# Patient Record
Sex: MALE | Race: WHITE | Employment: OTHER | URBAN - METROPOLITAN AREA
[De-identification: names, ages, dates, MRNs, and addresses within clinical notes are randomized per-mention and may not be internally consistent; named-entity substitution may affect disease eponyms.]

---

## 2022-08-27 ENCOUNTER — OFFICE VISIT (OUTPATIENT)
Dept: ORTHOPEDIC SURGERY | Age: 42
End: 2022-08-27
Payer: COMMERCIAL

## 2022-08-27 VITALS — WEIGHT: 210 LBS | BODY MASS INDEX: 27.83 KG/M2 | HEIGHT: 73 IN

## 2022-08-27 DIAGNOSIS — M25.511 ACUTE PAIN OF RIGHT SHOULDER: Primary | ICD-10-CM

## 2022-08-27 DIAGNOSIS — M75.21 BICEPS TENDINITIS OF RIGHT SHOULDER: ICD-10-CM

## 2022-08-27 PROCEDURE — 99203 OFFICE O/P NEW LOW 30 MIN: CPT | Performed by: ORTHOPAEDIC SURGERY

## 2022-08-27 PROCEDURE — 20610 DRAIN/INJ JOINT/BURSA W/O US: CPT | Performed by: ORTHOPAEDIC SURGERY

## 2022-08-27 RX ORDER — LIDOCAINE HYDROCHLORIDE 10 MG/ML
4 INJECTION, SOLUTION INFILTRATION; PERINEURAL ONCE
Status: COMPLETED | OUTPATIENT
Start: 2022-08-27 | End: 2022-08-27

## 2022-08-27 RX ORDER — TRIAMCINOLONE ACETONIDE 40 MG/ML
80 INJECTION, SUSPENSION INTRA-ARTICULAR; INTRAMUSCULAR ONCE
Status: COMPLETED | OUTPATIENT
Start: 2022-08-27 | End: 2022-08-27

## 2022-08-27 RX ADMIN — TRIAMCINOLONE ACETONIDE 80 MG: 40 INJECTION, SUSPENSION INTRA-ARTICULAR; INTRAMUSCULAR at 09:26

## 2022-08-27 RX ADMIN — LIDOCAINE HYDROCHLORIDE 4 ML: 10 INJECTION, SOLUTION INFILTRATION; PERINEURAL at 09:25

## 2022-08-27 NOTE — PROGRESS NOTES
Serina Locke is seen today for evaluation of right shoulder pain. He was initially injured about 4 months ago swinging on a rope swing over river he felt a sharp pain in the anterior aspect of the right shoulder. He was treated by chiropractor for presumed bicipital tendinitis. He currently complains of discomfort with overhead activities, when he first gets up in the morning, and when he tries to do things like play pickle ball. He has been doing a stretching program and strengthening program.  With the arm at the side he does not have much pain. Pain is currently 2 out of 10. He is right-hand dominant. He is otherwise healthy. 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 History: Patient's relevant past family, medical, and social history are reviewed as part of today's visit. ROS of pertinent positives and negatives as above; otherwise negative. General Exam:    Vitals: Height 6' 1\" (1.854 m), weight 210 lb (95.3 kg). Constitutional: Patient is adequately groomed with no evidence of malnutrition  Mental Status: The patient is oriented to time, place and person. The patient's mood and affect are appropriate. Gait:  Patient walks with normal gait and station. Lymphatic: The lymphatic examination bilaterally reveals all areas to be without enlargement or induration. Vascular: Examination reveals no swelling or calf tenderness. Peripheral pulses are palpable and 2+. Neurological: The patient has good coordination. There is no weakness or sensory deficit. Skin:    Head/Neck: inspection reveals no rashes, ulcerations or lesions. Trunk:  inspection reveals no rashes, ulcerations or lesions. Right Lower Extremity: inspection reveals no rashes, ulcerations or lesions. Left Lower Extremity: inspection reveals no rashes, ulcerations or lesions. Examination of the cervical spine reveals no restriction in motion.   There are no reproduction of symptoms into either arm with flexion, extension, rotation or palpation. The patient has a negative Spurling sign, and no tenderness. Examination of the left shoulder reveals normal scapular control and no prominence. There is no pain over the acromioclavicular or sternoclavicular joints. The patient has no biceps pain. There is full range of motion. There is no pain with impingement testing. There is no pain with Leary maneuver. Lavaca's maneuver is normal.  There is no pain or apprehension in the abducted externally rotated position. There is no sulcus sign. There is no instability with anterior or posterior stress applied. The patient demonstrates full strength in the supraspinatus, infraspinatus, and subscapularis. Neurologic and vascular examination of the upper extremity  is normal.    Right shoulder exam shows mild tenderness over the long head bicep. He has pain with simulated throwing, palpation, and with Lavaca's maneuver. He has full strength in the cuff with only mild discomfort in the Leary and impingement positions. Xrays of the right shoulder were obtained today in the office and interpreted by me : AP in the scapular plane, axillary lateral, and scapular Y. These demonstrate: No acute bony abnormalities. Assessment: Right shoulder bicep tendinitis    Plan: I am recommending intra-articular cortisone injection followed by appropriate orthopedic based physical therapy. Procedure: Under sterile technique, right shoulder injected anteriorly into the glenohumeral space 80 mg of Kenalog and 40 mg of lidocaine. He tolerated that well. Will make referral for PT. If he has ongoing symptoms in 6 or 8 weeks I would recommend an MRI and he will let us know.

## 2022-09-01 ENCOUNTER — HOSPITAL ENCOUNTER (OUTPATIENT)
Dept: PHYSICAL THERAPY | Age: 42
Setting detail: THERAPIES SERIES
Discharge: HOME | End: 2022-09-01
Payer: COMMERCIAL

## 2022-09-01 PROCEDURE — 97110 THERAPEUTIC EXERCISES: CPT | Performed by: PHYSICAL THERAPIST

## 2022-09-01 PROCEDURE — 97161 PT EVAL LOW COMPLEX 20 MIN: CPT | Performed by: PHYSICAL THERAPIST

## 2022-09-01 PROCEDURE — 97530 THERAPEUTIC ACTIVITIES: CPT | Performed by: PHYSICAL THERAPIST

## 2022-09-01 NOTE — FLOWSHEET NOTE
[facility] a Department of [address]  [facility], [address]  Office: [phone]  Fax:  [phone]                     Physical Therapy Treatment Note/ Progress Report:           Date:  2022    Patient Name:  Rad Palomino    :  1980  MRN: [id number]  Restrictions/Precautions:    Medical/Treatment Diagnosis Information:  Diagnosis: M75.21 (ICD-10-CM) - Biceps tendinitis of right shoulder; M25.511 (ICD-10-CM) - Acute pain of right shoulder  Treatment Diagnosis: M25.511 (ICD-10-CM) - Acute pain of right shoulder  Insurance/Certification information:  PT Insurance Information: [employer]  Physician Information:  Referring Provider (secondary): Sapna  Has the plan of care been signed (Y/N):        []  Yes  [x]  No     Date of Patient follow up with Physician: after -22 Oct 2022 prn      Is this a Progress Report:     []  Yes  [x]  No        If Yes:  Date Range for reporting period:  Beginning 2022  Ending    Progress report will be due (10 Rx or 30 days whichever is less): 2022        Visit # Insurance Allowable Auth Required   1 BMN []  Yes [x]  No        Functional Scale: FOTO-64   Date assessed: 2022      Latex Allergy:  [x]NO      []YES  Preferred Language for Healthcare:   [x]English       []other:    Pain level:  See eval     SUBJECTIVE:  See eval    OBJECTIVE: See eval  Observation:   Test measurements:      ROM PROM AROM  Comment    L R L R    Flexion        Abduction        ER        IR        Other        Other             Strength L R Comment   Flexion      Abduction      ER      IR      Supraspinatus      Upper Trap      Lower Trap      Mid Trap      Rhomboids      Biceps      Triceps      Horizontal Abduction      Horizontal Adduction      Lats          RESTRICTIONS/PRECAUTIONS: COVID-  without lasting effects;  appendectomy ;        Exercises/Interventions:   Exercise/Equipment Resistance/Repetitions Other comments   Aerobic Conditioning     Aerodyne          Stretching/PROM     Wand     Table Slides     Wall slides  10x:10 Flex/scap   UE Primrose- ER 10x:10 Supine at 90. Reviewed for sitting bow   Pulleys     Pendulum     BB IR     SL IR 10x:10 On wall   Pec doorway stretch     CBA stretch     UT stretch     LS stretch     Isometrics     Retraction          Weight shift     Flexion     Abduction     External Rotation     Internal Rotation     Biceps     Triceps          PRE's     Flexion     Abduction     External Rotation     Internal Rotation     Shrugs     EXT     Reverse Flys     Serratus 3x10    Horizontal Abd with ER     Biceps     Triceps     Retraction          Cable Column/Theraband     External Rotation     Internal Rotation     Shrugs     Lats     Ext     Flex     Scapular Retraction 3x10 silver Progress NV   BIC     TRIC     PNF          Dynamic Stability          Plyoback                  Therapeutic Exercise and NMR EXR  [x] (92268) Provided verbal/tactile cueing for activities related to strengthening, flexibility, endurance, ROM for improvements in LE, proximal hip, and core control with self care, mobility, lifting, ambulation. [x] (31443) Provided verbal/tactile cueing for activities related to improving balance, coordination, kinesthetic sense, posture, motor skill, proprioception  to assist with LE, proximal hip, and core control in self care, mobility, lifting, ambulation and eccentric single leg control.      NMR and Therapeutic Activities:    [x] (45321 or 88732) Provided verbal/tactile cueing for activities related to improving balance, coordination, kinesthetic sense, posture, motor skill, proprioception and motor activation to allow for proper function of core, proximal hip and LE with self care and ADLs  [x] (41890) Gait Re-education- Provided training and instruction to the patient for proper LE, core and proximal hip recruitment and positioning and eccentric body weight control with ambulation re-education including up and down stairs     Home Exercise Program:    [x] (90065) Reviewed/Progressed HEP activities related to strengthening, flexibility, endurance, ROM of core, proximal hip and LE for functional self-care, mobility, lifting and ambulation/stair navigation   [x] (14845)Reviewed/Progressed HEP activities related to improving balance, coordination, kinesthetic sense, posture, motor skill, proprioception of core, proximal hip and LE for self care, mobility, lifting, and ambulation/stair navigation      Access Code: [email or web address]  URL: [email or web address]  Date: 09/01/2022  Prepared by: Melanie Rodriguez Cessna    Exercises  Shoulder Flexion Wall Slide with Towel - 2 x daily - 7 x weekly - 10 reps - 10 hold  Standing Shoulder Abduction Wall Slide with Thumb Out - 2 x daily - 7 x weekly - 10 reps  Supine Shoulder External Rotation in 45 Degrees Abduction AAROM with Dowel - 2 x daily - 7 x weekly - 10 reps - 10 hold  Seated Shoulder External Rotation PROM on Table - 2 x daily - 7 x weekly - 10 reps - 10 hold  Standing Sleeper Stretch at [place] Gladwin - 2 x daily - 7 x weekly - 10 reps - 10 hold  Supine Scapular Protraction in Flexion with Dumbbells - 2 x daily - 7 x weekly - 3 sets - 10 reps  Scapular Retraction with Resistance - 2 x daily - 7 x weekly - 3 sets - 10 reps      Manual Treatments:  PROM / STM / Oscillations-Mobs:  G-I, II, III, IV (PA's, Inf., Post.)  [x] (77448) Provided manual therapy to mobilize LE, proximal hip and/or LS spine soft tissue/joints for the purpose of modulating pain, promoting relaxation,  increasing ROM, reducing/eliminating soft tissue swelling/inflammation/restriction, improving soft tissue extensibility and allowing for proper ROM for normal function with self care, mobility, lifting and ambulation.      Modalities:      Charges:  Timed Code Treatment Minutes: 30'   Total Treatment Minutes: 72'     [x] MANISHA (LOW) [phone]   [] MANISHA (MOD) 91120   [] EVAL (HIGH) 49811   [] RE-EVAL   [x] CA(67433) x 1     [] IONTO  [] NMR (58264) x     [] VASO  [] Manual (08465) x      [] Other:  [x] TA x   1   [] Mech Traction (57937)  [] ES(attended) (92149)      [] ES (un) (41307):       GOALS:   Patient stated goal: heal biceps tendon; relieve pain    [] Progressing: [] Met: [] Not Met: [] Adjusted     Therapist goals for Patient:   Short Term Goals: To be achieved in: 2 weeks  1. Independent in HEP and progression per patient tolerance, in order to prevent re-injury. [] Progressing: [] Met: [] Not Met: [] Adjusted   2. Patient will report pain at worst less than or equal to 4/10. [] Progressing: [] Met: [] Not Met: [] Adjusted     Long Term Goals: To be achieved in: 8 weeks  1. Pt will demo FOTO of 78 or better to assist with reaching prior level of function. [] Progressing: [] Met: [] Not Met: [] Adjusted  2. Patient will demonstrate increased AROM to shoulder flex greater than or equal to 174            , ABD greater than or equal to 170, ER greater than or equal to 80, IR greater than or equal to 50 to allow for proper joint functioning as indicated by patients Functional Deficits. [] Progressing: [] Met: [] Not Met: [] Adjusted  3. Patient will demonstrate an increase in strength to shoulder flex greater than or equal to 4+, ABD greater than or equal to 4, ER greater than or equal to 4, IR greater than or equal to 4+ to allow for proper functional mobility as indicated by patients functional deficits. [] Progressing: [] Met: [] Not Met: [] Adjusted  4. Patient will return to reaching overhead without increased symptoms or restriction. [] Progressing: [] Met: [] Not Met: [] Adjusted  5. Pt will return to normal workout routine without c/o pain. [] Progressing: [] Met: [] Not Met: [] Adjusted       6. Pt will report pain at worst less than or equal to 2/10.    [] Progressing: [] Met: [] Not Met: [] Adjusted       Overall Progression Towards Functional goals/ Treatment Progress Update:  [] Patient is progressing as expected towards functional goals listed. [] Progression is slowed due to complexities/Impairments listed. [] Progression has been slowed due to co-morbidities. [x] Plan just implemented, too soon to assess goals progression <30days   [] Goals require adjustment due to lack of progress  [] Patient is not progressing as expected and requires additional follow up with physician  [] Other    Prognosis for POC: [x] Good [] Fair  [] Poor      Patient requires continued skilled intervention: [x] Yes  [] No    Treatment/Activity Tolerance:  [x] Patient able to complete treatment  [] Patient limited by fatigue  [] Patient limited by pain     [] Patient limited by other medical complications  [] Other: Pt is a 34 y/o male presenting with diagnosis of right shoulder biceps tendinitis from the MD.  Clinically, the pt presents with decreased ROM, decreased strength, decreased function, and increased pain consistent with the MD diagnosis. The pt would benefit from skilled PT to return to [facility]. Pt has had shoulder pain for months. He recently saw Dr. Emily Thakkar and received a cortisone injection, which has been very helpful. He does have significantly limited ROM and some pain with these motions. Pt will be in town through the year. We did discuss gym options and how to modify his routine for home. He has had significant relief since his injection. We did review insurance benefits. All of pt's questions were answered. Pt was agreeable. PLAN: If pt doesn't return, this note can be considered a D/C note.   See eval  [] Continue per plan of care [] Alter current plan (see comments above)  [x] Plan of care initiated [] Hold pending MD visit [] Discharge    Electronically signed by: Katja Parks, PT, DPT, Board-Certified Clinical Specialist in Orthopaedic Physical Therapy [id number]

## 2022-09-01 NOTE — PLAN OF CARE
[facility] a Department of [address] [facility], [address]  Office: [phone]  Fax:  [phone]        Physical Therapy Certification    Dear Referring Provider (secondary): Sapna,    We had the pleasure of evaluating the following patient for physical therapy services at [facility]. A summary of our findings can be found in the initial assessment below. This includes our plan of care. If you have any questions or concerns regarding these findings, please do not hesitate to contact me at the office phone number checked above. Thank you for the referral.       Physician Signature:_______________________________Date:__________________  By signing above (or electronic signature), therapists plan is approved by physician      Patient: Ishmael Turpin   : 1980   MRN: [id number]  Referring Physician: Referring Provider (secondary): Sapna      Evaluation Date: 2022      Medical Diagnosis Information:  Diagnosis: M75.21 (ICD-10-CM) - Biceps tendinitis of right shoulder; M25.511 (ICD-10-CM) - Acute pain of right shoulder   Treatment Diagnosis: M25.511 (ICD-10-CM) - Acute pain of right shoulder                                           Precautions/ Contra-indications: COVID-  without lasting effects;  appendectomy ;   C-SSRS Triggered by Intake questionnaire (Past 2 wk assessment):   [x] No, Questionnaire did not trigger screening.   [] Yes, Patient intake triggered further evaluation      [] C-SSRS Screening completed  [] PCP notified via Plan of Care  [] Emergency services notified   Latex Allergy:  [x]NO      []YES  Preferred Language for Healthcare:   [x]English       []other:    SUBJECTIVE: Patient stated complaint:About 3-4 months ago, pt was going off a rope swing. He felt a burning in his shoulder. He ignored it for a fair amount of time.   About 6-7 wks ago, he was seeing a chiro for something else. He was dx with biceps tendon. He did some therapy on it. This was back home in [place], [place]. Pt is RHD. Then he moved here for work. The pain happened intermittently. He would get sharp intermittent moments of pain like with playing pickleball (OH stroke). Then he had pain throwing the covers over himself. Then he sought MD attention. He was seen by Dr. Morro Call, and he received a cortisone injection. He was working out in [employer] 3x/wk, but he has modified this and avoided working his UE to make sure that he wasn't irritating anything. He feels much better af the injection, but he wants to make sure he doesn't do anything to irritate it. Relevant Medical History:COVID-  without lasting effects;  appendectomy 2003; Functional Scale:  FOTO-64    Pain Scale: 0/10  Easing factors: chiro, injection, ice occ, Advil  Provocative factors: playing pickle ball; 6-7/10 at worst, saw stars and dropped his arm; throwing sheets over; sitting up in bed putting arms behind him; certain movements hurt; currently avoiding reaching OH    Type: []Constant   []Intermittent  []Radiating []Localized []other: sharp/stabbing and deep with certain mvts; feels hot     Numbness/Tingling: none    Functional Limitations/Impairments: [x]Lifting/reaching []Grooming []Carrying    []ADL's  []Driving [x]Sports/Recreations   []Other:    Occupation/School:     Living Status/Prior Level of Function: Independent with ADLs and IADLs, basketball; press on bench with free weights, and biceps; Did Pilates 2x/wk at home with reformer at home; play basketball once a week if he can; Pickle ball 1x/wk. Lifting 3x/wk. Usually 3-4x wk workout and a sport on the weekend.     (insert highest prior level of function)    OBJECTIVE:     CERV ROM WNL    Cervical Flexion     Cervical Extension     Cervical SB     Cervical rotation         ROM PROM AROM  Comment    L R L R    Flexion   174 170 pain Abduction   182 147 pain    ER   72 68    IR   51 47    Other        Other             Strength L R Comment   Flexion 4+ 4 feels something    Abduction 4 4    ER 4 4-    IR 4+ 4    Supraspinatus      Upper Trap      Lower Trap      Mid Trap      Rhomboids      Biceps      Triceps      Horizontal Abduction      Horizontal Adduction      Lats        Special Tests Left Right   Apley Scratch IR:  ER:   Cross body: IR:  ER:  Cross Body: -   Neer's  Slight postive   Full Can     Empty Can     Romelle Pace  -   Nerve Tension Testing     Speed's - -   Willson's      Spurling's     Repeated Scaption                Reflexes/Sensation (myotomes/dermatomes): intact    Joint mobility: stiff   []Normal    []Hypo   []Hyper    Palpation: -TTP    Functional Mobility/Transfers: see above    Posture: fwd head, thoracic kyphosis posture    Bandages/Dressings/Incisions: NA    Gait:  WNL    Orthopedic Special Tests:  see above                       [x] Patient history, allergies, meds reviewed. Medical chart reviewed. See intake form. Review Of Systems (ROS):  [x]Performed Review of systems (Integumentary, CardioPulmonary, Neurological) by intake and observation. Intake form has been scanned into medical record. Patient has been instructed to contact their primary care physician regarding ROS issues if not already being addressed at this time.       Co-morbidities/Complexities (which will affect course of rehabilitation):   [x]None           Arthritic conditions   []Rheumatoid arthritis (M05.9)  []Osteoarthritis (M19.91)   Cardiovascular conditions   []Hypertension (I10)  []Hyperlipidemia (E78.5)  []Angina pectoris (I20)  []Atherosclerosis (I70)   Musculoskeletal conditions   []Disc pathology   []Congenital spine pathologies   []Prior surgical intervention  []Osteoporosis (M81.8)  []Osteopenia (M85.8)   Endocrine conditions   []Hypothyroid (E03.9)  []Hyperthyroid Gastrointestinal conditions   []Constipation (P18.00)   Metabolic conditions   []Morbid obesity (E66.01)  []Diabetes type 1(E10.65) or 2 (E11.65)   []Neuropathy (G60.9)     Pulmonary conditions   []Asthma (J45)  []Coughing   []COPD (J44.9)   Psychological Disorders  []Anxiety (F41.9)  []Depression (F32.9)   []Other:   []Other:          Barriers to/and or personal factors that will affect rehab potential:              []Age  []Sex              [x]Motivation/Lack of Motivation                        []Co-Morbidities              []Cognitive Function, education/learning barriers              []Environmental, home barriers              []profession/work barriers  [x]past PT/medical experience  []other:  Justification: Pt has had shoulder pain for months. He recently saw Dr. Afshin Fermin and received a cortisone injection, which has been very helpful. He does have significantly limited ROM and some pain with these motions. Pt will be in town through the year. Falls Risk Assessment (30 days):   [x] Falls Risk assessed and no intervention required.   [] Falls Risk assessed and Patient requires intervention due to being higher risk   TUG score (>12s at risk):     [] Falls education provided, including           ASSESSMENT:   Functional Impairments   []Noted spinal or UE joint hypomobility   []Noted spinal or UE joint hypermobility   [x]Decreased UE functional ROM   [x]Decreased UE functional strength   []Abnormal reflexes/sensation/myotomal/dermatomal deficits   []Decreased RC/scapular/core strength and neuromuscular control   []other:      Functional Activity Limitations (from functional questionnaire and intake)   [x]Reduced ability to tolerate prolonged functional positions   [x]Reduced ability or difficulty with changes of positions or transfers between positions   [x]Reduced ability to maintain good posture and demonstrate good body mechanics with sitting, bending, and lifting   [] Reduced ability or tolerance with driving and/or computer work   []Reduced ability to sleep   []Reduced ability to perform lifting, reaching, carrying tasks   []Reduced ability to tolerate impact through UE   []Reduced ability to reach behind back   []Reduced ability to  or hold objects   [x]Reduced ability to throw or toss an object   []other:    Participation Restrictions   []Reduced participation in self care activities   [x]Reduced participation in home management activities   []Reduced participation in work activities   [x]Reduced participation in social activities. [x]Reduced participation in sport/recreation activities. Classification:   []Signs/symptoms consistent with post-surgical status including decreased ROM, strength and function. []Signs/symptoms consistent with joint sprain/strain   []Signs/symptoms consistent with shoulder impingement   []Signs/symptoms consistent with shoulder/elbow/wrist tendinopathy   []Signs/symptoms consistent with Rotator cuff tear   []Signs/symptoms consistent with labral tear   []Signs/symptoms consistent with postural dysfunction    []Signs/symptoms consistent with Glenohumeral IR Deficit - <45 degrees   []Signs/symptoms consistent with facet dysfunction of cervical/thoracic spine    []Signs/symptoms consistent with pathology which may benefit from Dry needling     []other: Pt is a 36 y/o male presenting with diagnosis of right shoulder biceps tendinitis from the MD.  Clinically, the pt presents with decreased ROM, decreased strength, decreased function, and increased pain consistent with the MD diagnosis. The pt would benefit from skilled PT to return to PLOF. Pt has had shoulder pain for months. He recently saw Dr. Irvin Sharma and received a cortisone injection, which has been very helpful. He does have significantly limited ROM and some pain with these motions. Pt will be in town through the year. We did discuss gym options and how to modify his routine for home. He has had significant relief since his injection. We did review insurance benefits.   All of pt's questions were answered. Pt was agreeable. Prognosis/Rehab Potential:      []Excellent   [x]Good    []Fair   []Poor    Tolerance of evaluation/treatment:    []Excellent   [x]Good    []Fair   []Poor    PLAN  Frequency/Duration:  1-2 days per week for 6-8 Weeks:  Interventions:  [x]  Therapeutic exercise including: strength training, ROM, for Lower extremity and core   [x]  NMR activation and proprioception for LE, Glutes and Core   [x]  Manual therapy as indicated for LE, Hip and spine to include: Dry Needling/IASTM, STM, PROM, Gr I-IV mobilizations, manipulation. [x] Modalities as needed that may include: thermal agents, E-stim, Biofeedback, US, iontophoresis as indicated  [x] Patient education on joint protection, postural re-education, activity modification, progression of HEP. HEP instruction: (see scanned forms)      GOALS:   Patient stated goal: heal biceps tendon; relieve pain    [] Progressing: [] Met: [] Not Met: [] Adjusted    Therapist goals for Patient:   Short Term Goals: To be achieved in: 2 weeks  1. Independent in HEP and progression per patient tolerance, in order to prevent re-injury. [] Progressing: [] Met: [] Not Met: [] Adjusted   2. Patient will report pain at worst less than or equal to 4/10. [] Progressing: [] Met: [] Not Met: [] Adjusted    Long Term Goals: To be achieved in: 8 weeks  1. Pt will demo FOTO of 78 or better to assist with reaching prior level of function. [] Progressing: [] Met: [] Not Met: [] Adjusted  2. Patient will demonstrate increased AROM to shoulder flex greater than or equal to 174 , ABD greater than or equal to 170, ER greater than or equal to 80, IR greater than or equal to 50 to allow for proper joint functioning as indicated by patients Functional Deficits. [] Progressing: [] Met: [] Not Met: [] Adjusted  3.  Patient will demonstrate an increase in strength to shoulder flex greater than or equal to 4+, ABD greater than or equal to 4, ER greater than or equal to 4, IR greater than or equal to 4+ to allow for proper functional mobility as indicated by patients functional deficits. [] Progressing: [] Met: [] Not Met: [] Adjusted  4. Patient will return to reaching overhead without increased symptoms or restriction. [] Progressing: [] Met: [] Not Met: [] Adjusted  5. Pt will return to normal workout routine without c/o pain. [] Progressing: [] Met: [] Not Met: [] Adjusted   6. Pt will report pain at worst less than or equal to 2/10. [] Progressing: [] Met: [] Not Met: [] Adjusted          Physical Therapy Evaluation Complexity Justification  [x] A history of present problem with:  [] no personal factors and/or comorbidities that impact the plan of care;  [x]1-2 personal factors and/or comorbidities that impact the plan of care  []3 personal factors and/or comorbidities that impact the plan of care  [x] An examination of body systems using standardized tests and measures addressing any of the following: body structures and functions (impairments), activity limitations, and/or participation restrictions;:  [] a total of 1-2 or more elements   [] a total of 3 or more elements   [x] a total of 4 or more elements   [x] A clinical presentation with:  [x] stable and/or uncomplicated characteristics   [] evolving clinical presentation with changing characteristics  [] unstable and unpredictable characteristics;   [x] Clinical decision making of [x] low, [] moderate, [] high complexity using standardized patient assessment instrument and/or measurable assessment of functional outcome.     [x] EVAL (LOW) 50479 (typically 20 minutes face-to-face)  [] EVAL (MOD) 99074 (typically 30 minutes face-to-face)  [] EVAL (HIGH) 82368 (typically 45 minutes face-to-face)  [] RE-EVAL 35973    Electronically signed by:  Kandice Juárez, PT, DPT, Board-Certified Clinical Specialist in Orthopaedic Physical Therapy [id number]

## 2022-09-08 ENCOUNTER — APPOINTMENT (OUTPATIENT)
Dept: PHYSICAL THERAPY | Age: 42
End: 2022-09-08
Payer: COMMERCIAL

## 2022-09-12 ENCOUNTER — HOSPITAL ENCOUNTER (OUTPATIENT)
Dept: PHYSICAL THERAPY | Age: 42
Setting detail: THERAPIES SERIES
Discharge: HOME | End: 2022-09-12
Payer: COMMERCIAL

## 2022-09-12 PROCEDURE — 97112 NEUROMUSCULAR REEDUCATION: CPT | Performed by: PHYSICAL THERAPIST

## 2022-09-12 PROCEDURE — 97110 THERAPEUTIC EXERCISES: CPT | Performed by: PHYSICAL THERAPIST

## 2022-09-12 PROCEDURE — 97530 THERAPEUTIC ACTIVITIES: CPT | Performed by: PHYSICAL THERAPIST

## 2022-09-12 NOTE — FLOWSHEET NOTE
100 [facility] a Department of [address]  [address], [address]  Office: [phone]  Fax:  [phone]                     Physical Therapy Treatment Note/ Progress Report:           Date:  2022    Patient Name:  Guera Welsh    :  1980  MRN: [id number]  Restrictions/Precautions:    Medical/Treatment Diagnosis Information:  Diagnosis: M75.21 (ICD-10-CM) - Biceps tendinitis of right shoulder; M25.511 (ICD-10-CM) - Acute pain of right shoulder  Treatment Diagnosis: M25.511 (ICD-10-CM) - Acute pain of right shoulder  Insurance/Certification information:  PT Insurance Information: Matt Lara  Physician Information:  Referring Provider (secondary): Sapna  Has the plan of care been signed (Y/N):        []  Yes  [x]  No     Date of Patient follow up with Physician: after 8-22 Oct 2022 prn      Is this a Progress Report:     []  Yes  [x]  No        If Yes:  Date Range for reporting period:  Beginning 2022  Ending    Progress report will be due (10 Rx or 30 days whichever is less): 2022        Visit # Insurance Allowable Auth Required   2 BMN []  Yes [x]  No        Functional Scale: Dorian Balderas   Date assessed: 2022      Latex Allergy:  [x]NO      []YES  Preferred Language for Healthcare:   [x]English       []other:    Pain level:  0/10     SUBJECTIVE:  He is feeling pretty good overall. He hasn't had much pain. He has started doing some lifting on his own. He hasn't joined a gym, but he plans on lifting in his apartment.      OBJECTIVE: 2022  Observation:   Test measurements:      ROM PROM AROM  Comment    L R L R    Flexion    180    Abduction    175    ER    82    IR    56    Other        Other             Strength L R Comment   Flexion      Abduction      ER      IR      Supraspinatus      Upper Trap      Lower Trap      Mid Trap      Rhomboids      Biceps      Triceps      Horizontal Abduction Horizontal Adduction      Lats          RESTRICTIONS/PRECAUTIONS: COVID-  without lasting effects;  appendectomy 2003; Exercises/Interventions:   Exercise/Equipment Resistance/Repetitions Other comments   Aerobic Conditioning     Aerodyne          Stretching/PROM     Wand     Table Slides     Wall slides  10x:10 Flex/scap   UE Bonifay- ER Pulleys     Pendulum     BB IR 10x:10    SL IR Pec doorway stretch 3x:30    CBA stretch     UT stretch     LS stretch     Isometrics     Retraction          Weight shift     Flexion     Abduction     External Rotation     Internal Rotation     Biceps     Triceps          PRE's     Flexion 3x1 2#    Abduction 3x10 4#    External Rotation     Internal Rotation 3x10 7#    Shrugs     EXT     Reverse Flys     Serratus 3x10 3#    Horizontal Abd with ER     Biceps Reviewed ECL for HEP Pt did these yesterday. Triceps 3x10 10#    Retraction 3x10 10#         Cable Column/Theraband     External Rotation 3x10 red    Internal Rotation     Shrugs     Lats     Ext 3x10 7#    Flex     Scapular Retraction BIC     TRIC     PNF          Dynamic Stability          Plyoback                  Therapeutic Exercise and NMR EXR  [x] (82262) Provided verbal/tactile cueing for activities related to strengthening, flexibility, endurance, ROM for improvements in LE, proximal hip, and core control with self care, mobility, lifting, ambulation. [x] (66501) Provided verbal/tactile cueing for activities related to improving balance, coordination, kinesthetic sense, posture, motor skill, proprioception  to assist with LE, proximal hip, and core control in self care, mobility, lifting, ambulation and eccentric single leg control.      NMR and Therapeutic Activities:    [x] (06471 or 59234) Provided verbal/tactile cueing for activities related to improving balance, coordination, kinesthetic sense, posture, motor skill, proprioception and motor activation to allow for proper function of core, proximal hip and LE with self care and ADLs  [x] (88950) Gait Re-education- Provided training and instruction to the patient for proper LE, core and proximal hip recruitment and positioning and eccentric body weight control with ambulation re-education including up and down stairs     Home Exercise Program:    [x] (05937) Reviewed/Progressed HEP activities related to strengthening, flexibility, endurance, ROM of core, proximal hip and LE for functional self-care, mobility, lifting and ambulation/stair navigation   [x] (78582)Reviewed/Progressed HEP activities related to improving balance, coordination, kinesthetic sense, posture, motor skill, proprioception of core, proximal hip and LE for self care, mobility, lifting, and ambulation/stair navigation      Access Code: [email or web address]  URL: [email or web address]  Date: 09/12/2022  Prepared by: Slim Herrera    Exercises  Shoulder Flexion Wall Slide with Towel - 2 x daily - 7 x weekly - 10 reps - 10 hold  Standing Shoulder Abduction Wall Slide with Thumb Out - 2 x daily - 7 x weekly - 10 reps  Supine Shoulder External Rotation in 45 Degrees Abduction AAROM with Dowel - 2 x daily - 7 x weekly - 10 reps - 10 hold  Seated Shoulder External Rotation PROM on Table - 2 x daily - 7 x weekly - 10 reps - 10 hold  Doorway Pec Stretch at 90 Degrees Abduction - 1 x daily - 7 x weekly - 3 reps - 30 hold  Standing Sleeper Stretch at [place] - 2 x daily - 7 x weekly - 10 reps - 10 hold  Standing Shoulder Internal Rotation Stretch with Towel - 2 x daily - 7 x weekly - 10 reps - 10 hold  Supine Scapular Protraction in Flexion with Dumbbells - 2 x daily - 7 x weekly - 3 sets - 10 reps  Scapular Retraction with Resistance - 1 x daily - 3 x weekly - 3 sets - 10 reps  Prone Shoulder Row - 1 x daily - 3 x weekly - 3 sets - 10 reps  Prone Shoulder Extension - Single Arm - 1 x daily - 3 x weekly - 3 sets - 10 reps  Shoulder extension with resistance - Neutral - 1 x daily - 3 x weekly - 3 sets - 10 reps  Shoulder External Rotation with Anchored Resistance - 1 x daily - 3 x weekly - 3 sets - 10 reps  Shoulder Internal Rotation with Resistance - 1 x daily - 3 x weekly - 3 sets - 10 reps  Sidelying Shoulder Internal Rotation with Dumbbell - 1 x daily - 3 x weekly - 3 sets - 10 reps  Scaption with Dumbbells - 1 x daily - 3 x weekly - 3 sets - 10 reps  Standing Shoulder Flexion to 90 Degrees with Dumbbells - 1 x daily - 3 x weekly - 3 sets - 10 reps  Standing Bicep Curls Supinated with Dumbbells - 1 x daily - 3 x weekly - 3 sets - 10 reps  Standing Tricep Extensions with Resistance - 1 x daily - 3 x weekly - 3 sets - 10 reps  Standing Bent Over on Chair Single Arm Tricep Extension with Dumbbell - 1 x daily - 3 x weekly - 3 sets - 10 reps        Manual Treatments:  PROM / STM / Oscillations-Mobs:  G-I, II, III, IV (PA's, Inf., Post.)  [x] (19917) Provided manual therapy to mobilize LE, proximal hip and/or LS spine soft tissue/joints for the purpose of modulating pain, promoting relaxation,  increasing ROM, reducing/eliminating soft tissue swelling/inflammation/restriction, improving soft tissue extensibility and allowing for proper ROM for normal function with self care, mobility, lifting and ambulation. Modalities:      Charges:  Timed Code Treatment Minutes: 62'   Total Treatment Minutes: 61'     [] EVAL (LOW) 455 1011   [] EVAL (MOD) 09792   [] EVAL (HIGH) 30726   [] RE-EVAL   [x] HW(07132) x 2     [] IONTO  [x] NMR (17771) x 1    [] VASO  [] Manual (25627) x      [] Other:  [x] TA x   1   [] Mech Traction (08707)  [] ES(attended) (72303)      [] ES (un) (68244):       GOALS:   Patient stated goal: heal biceps tendon; relieve pain    [] Progressing: [] Met: [] Not Met: [] Adjusted     Therapist goals for Patient:   Short Term Goals: To be achieved in: 2 weeks  1. Independent in HEP and progression per patient tolerance, in order to prevent re-injury. [] Progressing: [] Met: [] Not Met: [] Adjusted   2. No    Treatment/Activity Tolerance:  [x] Patient able to complete treatment  [] Patient limited by fatigue  [] Patient limited by pain     [] Patient limited by other medical complications  [] Other: Pt catalino tx well. He demo significantly improved ROM compared to his eval.  I did update his HEP, and I marked which ones to focus on as he will be getting busier with work. He catalino all progressions well today with fatigue at the end of tx. We discussed spacing out visits, but we will continue next week as it is less busy than the following week. Pt would continue to benefit from skilled PT to improve strength, ROM, pain, function. PLAN: If pt doesn't return, this note can be considered a D/C note. Consider reassessing next week.    [x] Continue per plan of care [] Alter current plan (see comments above)  [] Plan of care initiated [] Hold pending MD visit [] Discharge    Electronically signed by: Kandice Juárez, PT, DPT, Board-Certified Clinical Specialist in Orthopaedic Physical Therapy [id number]

## 2022-09-19 ENCOUNTER — HOSPITAL ENCOUNTER (OUTPATIENT)
Dept: PHYSICAL THERAPY | Age: 42
Setting detail: THERAPIES SERIES
Discharge: HOME | End: 2022-09-19
Payer: COMMERCIAL

## 2022-09-19 PROCEDURE — 97530 THERAPEUTIC ACTIVITIES: CPT

## 2022-09-19 PROCEDURE — 97110 THERAPEUTIC EXERCISES: CPT

## 2022-09-19 PROCEDURE — 97112 NEUROMUSCULAR REEDUCATION: CPT

## 2022-09-19 NOTE — PLAN OF CARE
[facility] a Department of [address]  Shan [address] [place] [address], [address]  Office: [phone]  Fax:  [phone]                     Physical Therapy Treatment Note/ Progress Report:        Physical Therapy Re-Certification Plan of Care    Dear Dr. Dieudonne Rose,    We had the pleasure of treating the following patient for physical therapy services at [address] [place] [address]. A summary of our findings can be found in the updated assessment below. This includes our plan of care. If you have any questions or concerns regarding these findings, please do not hesitate to contact me at the office phone number checked above. Thank you for the referral.     Physician Signature:________________________________Date:__________________  By signing above (or electronic signature), therapists plan is approved by physician    Date Range Of Visits:   Total Visits to Date: 3  Overall Response to Treatment:   [] Patient is responding well to treatment and improvement is noted with regards to goals   [] Patient should continue to improve in reasonable time if they continue HEP   [] Patient has plateaued and is no longer responding to skilled PT intervention    [] Patient is getting worse and would benefit from return to referring MD   [] Patient unable to adhere to initial POC   [x] Other: Pt catalino tx well. Pt has scored 100 on the functional scale. He feels comfortable continuing as HEP. We did discuss trying to at least maintain some of the IR, ER strengthening. We discussed the [place] presses and how I prefer to avoid them, but if he does wish to continue [place] press, it will be important to work on stability and progress from lateral raises. Pt is understanding. Pt can return to see me or call prn. Otherwise, pt can continue as HEP.              Date:  2022    Patient Name:  Brian Pinto    :  1980  MRN: [id number]  Restrictions/Precautions:    Medical/Treatment Diagnosis Information:  Diagnosis: M75.21 (ICD-10-CM) - Biceps tendinitis of right shoulder; M25.511 (ICD-10-CM) - Acute pain of right shoulder  Treatment Diagnosis: M25.511 (ICD-10-CM) - Acute pain of right shoulder  Insurance/Certification information:  PT Insurance Information: Filomena Stewart  Physician Information:  Referring Provider (secondary): Sapna  Has the plan of care been signed (Y/N):        []  Yes  [x]  No     Date of Patient follow up with Physician: after 8-22 Oct 2022 prn      Is this a Progress Report:     [x]  Yes  []  No        If Yes:  Date Range for reporting period:  Beginning 9/1/2022  Ending 9/19/22    Progress report will be due (10 Rx or 30 days whichever is less): 17 Oct 2022        Visit # Insurance Allowable Auth Required   3 BMN []  Yes [x]  No        Functional Scale: FOTO-100   Date assessed: 9/19/2022      Latex Allergy:  [x]NO      []YES  Preferred Language for Healthcare:   [x]English       []other:    Pain level:  0/10     SUBJECTIVE:  Pt reports 98% improvement. He has even returned to his normal workout routine without c/o pain. He has not tried [place] presses. OBJECTIVE: 19 Sept 2022  Observation:   Test measurements:      ROM PROM AROM  Comment    L R L R    Flexion    177    Abduction    180    ER    83    IR    59    Other        Other             Strength L R Comment   Flexion  4+    Abduction  4+    ER  4+    IR  4+    Supraspinatus      Upper Trap      Lower Trap      Mid Trap      Rhomboids      Biceps      Triceps      Horizontal Abduction      Horizontal Adduction      Lats          RESTRICTIONS/PRECAUTIONS: COVID-  without lasting effects;  appendectomy 2003;        Exercises/Interventions:   Exercise/Equipment Resistance/Repetitions Other comments   Aerobic Conditioning     Aerodyne          Stretching/PROM     Wand     Table Slides     Wall slides  10x:10 Flex/scap   UE [place]- ER Pulleys     Pendulum     BB IR 10x:10    SL IR Pec doorway stretch 3x:30 Cuing for proper form   CBA stretch     UT stretch     LS stretch     Isometrics     Retraction          Weight shift     Flexion     Abduction     External Rotation     Internal Rotation     Biceps     Triceps          PRE's     Flexion 3x10 2#    Abduction 3x10 4#    External Rotation     Internal Rotation    Shrugs     EXT     Reverse Flys     Serratus 3x10 8#    Horizontal Abd with ER     Biceps Reviewed ECL for HEP    Triceps 3x15 10#    Retraction    Ws - retraction to ER to [place] press 3x8 red         Cable Column/Theraband     External Rotation 3x10 red     Internal Rotation 3x10 10# CC    Shrugs     Lats     Ext 3x10 7#    Flex     Scapular Retraction BIC     TRIC     PNF          Dynamic Stability          Plyoback                  Therapeutic Exercise and NMR EXR  [x] (55464) Provided verbal/tactile cueing for activities related to strengthening, flexibility, endurance, ROM for improvements in LE, proximal hip, and core control with self care, mobility, lifting, ambulation. [x] (10483) Provided verbal/tactile cueing for activities related to improving balance, coordination, kinesthetic sense, posture, motor skill, proprioception  to assist with LE, proximal hip, and core control in self care, mobility, lifting, ambulation and eccentric single leg control.      NMR and Therapeutic Activities:    [x] (47092 or 94169) Provided verbal/tactile cueing for activities related to improving balance, coordination, kinesthetic sense, posture, motor skill, proprioception and motor activation to allow for proper function of core, proximal hip and LE with self care and ADLs  [x] (45090) Gait Re-education- Provided training and instruction to the patient for proper LE, core and proximal hip recruitment and positioning and eccentric body weight control with ambulation re-education including up and down stairs     Home Exercise Program:    [x] (08068) Reviewed/Progressed HEP activities related to strengthening, flexibility, endurance, ROM of core, proximal hip and LE for functional self-care, mobility, lifting and ambulation/stair navigation   [x] (22056)Reviewed/Progressed HEP activities related to improving balance, coordination, kinesthetic sense, posture, motor skill, proprioception of core, proximal hip and LE for self care, mobility, lifting, and ambulation/stair navigation      Access Code: [email or web address]  URL: [email or web address]  Date: 09/12/2022  Prepared by: Noah Herrera    Exercises  Shoulder Flexion Wall Slide with Towel - 2 x daily - 7 x weekly - 10 reps - 10 hold  Standing Shoulder Abduction Wall Slide with Thumb Out - 2 x daily - 7 x weekly - 10 reps  Supine Shoulder External Rotation in 45 Degrees Abduction AAROM with Dowel - 2 x daily - 7 x weekly - 10 reps - 10 hold  Seated Shoulder External Rotation PROM on Table - 2 x daily - 7 x weekly - 10 reps - 10 hold  Doorway Pec Stretch at 90 Degrees Abduction - 1 x daily - 7 x weekly - 3 reps - 30 hold  Standing Sleeper Stretch at [place] [employer] - 2 x daily - 7 x weekly - 10 reps - 10 hold  Standing Shoulder Internal Rotation Stretch with Towel - 2 x daily - 7 x weekly - 10 reps - 10 hold  Supine Scapular Protraction in Flexion with Dumbbells - 2 x daily - 7 x weekly - 3 sets - 10 reps  Scapular Retraction with Resistance - 1 x daily - 3 x weekly - 3 sets - 10 reps  Prone Shoulder Row - 1 x daily - 3 x weekly - 3 sets - 10 reps  Prone Shoulder Extension - Single Arm - 1 x daily - 3 x weekly - 3 sets - 10 reps  Shoulder extension with resistance - Neutral - 1 x daily - 3 x weekly - 3 sets - 10 reps  Shoulder External Rotation with Anchored Resistance - 1 x daily - 3 x weekly - 3 sets - 10 reps  Shoulder Internal Rotation with Resistance - 1 x daily - 3 x weekly - 3 sets - 10 reps  Sidelying Shoulder Internal Rotation with Dumbbell - 1 x daily - 3 x weekly - 3 sets - 10 reps  Scaption with Dumbbells - 1 x daily - 3 x weekly - 3 sets - 10 reps  Standing Shoulder Flexion to 90 Degrees with Dumbbells - 1 x daily - 3 x weekly - 3 sets - 10 reps  Standing Bicep Curls Supinated with Dumbbells - 1 x daily - 3 x weekly - 3 sets - 10 reps  Standing Tricep Extensions with Resistance - 1 x daily - 3 x weekly - 3 sets - 10 reps  Standing Bent Over on Chair Single Arm Tricep Extension with Dumbbell - 1 x daily - 3 x weekly - 3 sets - 10 reps        Manual Treatments:  PROM / STM / Oscillations-Mobs:  G-I, II, III, IV (PA's, Inf., Post.)  [x] (54076) Provided manual therapy to mobilize LE, proximal hip and/or LS spine soft tissue/joints for the purpose of modulating pain, promoting relaxation,  increasing ROM, reducing/eliminating soft tissue swelling/inflammation/restriction, improving soft tissue extensibility and allowing for proper ROM for normal function with self care, mobility, lifting and ambulation. Modalities:      Charges:   Timed Code Treatment Minutes: 37'   Total Treatment Minutes: 54'     [] EVAL (LOW) 455 1011   [] EVAL (MOD) 02774   [] EVAL (HIGH) 53185   [] RE-EVAL   [x] CT(20261) x 1     [] IONTO  [x] NMR (09290) x 1    [] VASO  [] Manual (88605) x      [] Other:  [x] TA x   1   [] Mech Traction (47896)  [] ES(attended) (93929)      [] ES (un) (89267):       GOALS:   Patient stated goal: heal biceps tendon; relieve pain    [] Progressing: [x] Met: [] Not Met: [] Adjusted     Therapist goals for Patient:   Short Term Goals: To be achieved in: 2 weeks  1. Independent in HEP and progression per patient tolerance, in order to prevent re-injury. [] Progressing: [x] Met: [] Not Met: [] Adjusted   2. Patient will report pain at worst less than or equal to 4/10. [] Progressing: [x] Met: [] Not Met: [] Adjusted     Long Term Goals: To be achieved in: 8 weeks  1. Pt will demo FOTO of 78 or better to assist with reaching prior level of function. [] Progressing: [x] Met: [] Not Met: [] Adjusted   2.  Patient will demonstrate increased AROM to shoulder flex greater than or equal to 174            , ABD greater than or equal to 170, ER greater than or equal to 80, IR greater than or equal to 50 to allow for proper joint functioning as indicated by patients Functional Deficits. [] Progressing: [x] Met: [] Not Met: [] Adjusted  3. Patient will demonstrate an increase in strength to shoulder flex greater than or equal to 4+, ABD greater than or equal to 4, ER greater than or equal to 4, IR greater than or equal to 4+ to allow for proper functional mobility as indicated by patients functional deficits. [] Progressing: [x] Met: [] Not Met: [] Adjusted  4. Patient will return to reaching overhead without increased symptoms or restriction. [] Progressing: [x] Met: [] Not Met: [] Adjusted  5. Pt will return to normal workout routine without c/o pain. [] Progressing: [x] Met: [] Not Met: [] Adjusted       6. Pt will report pain at worst less than or equal to 2/10. [] Progressing: [x] Met: [] Not Met: [] Adjusted       Overall Progression Towards Functional goals/ Treatment Progress Update:  [x] Patient is progressing as expected towards functional goals listed. [] Progression is slowed due to complexities/Impairments listed. [] Progression has been slowed due to co-morbidities. [] Plan just implemented, too soon to assess goals progression <30days   [] Goals require adjustment due to lack of progress  [] Patient is not progressing as expected and requires additional follow up with physician  [] Other    Prognosis for POC: [x] Good [] Fair  [] Poor      Patient requires continued skilled intervention: [x] Yes  [] No    Treatment/Activity Tolerance:  [x] Patient able to complete treatment  [] Patient limited by fatigue  [] Patient limited by pain     [] Patient limited by other medical complications  [] Other: Pt catalino tx well. Pt has scored 100 on the functional scale. He feels comfortable continuing as HEP.   We did discuss trying to at least maintain some of the IR, ER strengthening. We discussed the [place] presses and how I prefer to avoid them, but if he does wish to continue [place] press, it will be important to work on stability and progress from lateral raises. Pt is understanding. Pt can return to see me or call prn. Otherwise, pt can continue as HEP. PLAN: If pt doesn't return, this note can be considered a D/C note.     [x] Continue per plan of care [] Alter current plan (see comments above)  [] Plan of care initiated [] Hold pending MD visit [] Discharge    Electronically signed by: Tucker Gillespie PTA,    Co-Treated and progress note by: Sanju Diaz, PT, DPT, Board-Certified Clinical Specialist in        Orthopaedic Physical Therapy [id number]

## 2022-09-26 ENCOUNTER — APPOINTMENT (OUTPATIENT)
Dept: PHYSICAL THERAPY | Age: 42
End: 2022-09-26
Payer: COMMERCIAL